# Patient Record
Sex: MALE | Race: BLACK OR AFRICAN AMERICAN | Employment: FULL TIME | URBAN - METROPOLITAN AREA
[De-identification: names, ages, dates, MRNs, and addresses within clinical notes are randomized per-mention and may not be internally consistent; named-entity substitution may affect disease eponyms.]

---

## 2017-11-25 ENCOUNTER — HOSPITAL ENCOUNTER (EMERGENCY)
Age: 32
Discharge: HOME | End: 2017-11-25
Attending: EMERGENCY MEDICINE | Admitting: EMERGENCY MEDICINE
Payer: COMMERCIAL

## 2017-11-25 VITALS
SYSTOLIC BLOOD PRESSURE: 137 MMHG | TEMPERATURE: 98 F | RESPIRATION RATE: 16 BRPM | BODY MASS INDEX: 27.77 KG/M2 | OXYGEN SATURATION: 97 % | HEART RATE: 83 BPM | WEIGHT: 205 LBS | HEIGHT: 72 IN | DIASTOLIC BLOOD PRESSURE: 91 MMHG

## 2017-11-25 DIAGNOSIS — L03.011 CELLULITIS OF FINGER OF RIGHT HAND: Primary | ICD-10-CM

## 2017-11-25 PROCEDURE — 99283 EMERGENCY DEPT VISIT LOW MDM: CPT

## 2017-11-25 PROCEDURE — 74011250637 HC RX REV CODE- 250/637: Performed by: PHYSICIAN ASSISTANT

## 2017-11-25 RX ORDER — CEPHALEXIN 250 MG/1
500 CAPSULE ORAL
Status: COMPLETED | OUTPATIENT
Start: 2017-11-25 | End: 2017-11-25

## 2017-11-25 RX ORDER — SULFAMETHOXAZOLE AND TRIMETHOPRIM 400; 80 MG/1; MG/1
1 TABLET ORAL
Status: COMPLETED | OUTPATIENT
Start: 2017-11-25 | End: 2017-11-25

## 2017-11-25 RX ORDER — CEPHALEXIN 500 MG/1
500 CAPSULE ORAL 4 TIMES DAILY
Qty: 28 CAP | Refills: 0 | Status: SHIPPED | OUTPATIENT
Start: 2017-11-25 | End: 2017-12-02

## 2017-11-25 RX ORDER — SULFAMETHOXAZOLE AND TRIMETHOPRIM 800; 160 MG/1; MG/1
1 TABLET ORAL 2 TIMES DAILY
Qty: 14 TAB | Refills: 0 | Status: SHIPPED | OUTPATIENT
Start: 2017-11-25 | End: 2017-12-02

## 2017-11-25 RX ADMIN — SULFAMETHOXAZOLE AND TRIMETHOPRIM 1 TABLET: 400; 80 TABLET ORAL at 20:32

## 2017-11-25 RX ADMIN — CEPHALEXIN 500 MG: 250 CAPSULE ORAL at 20:39

## 2017-11-26 NOTE — ED PROVIDER NOTES
HPI Comments: Geraldine Schroeder is a 28 y.o. Male who presents to the ED c/o right middle finger redness and swelling for the past 1-2 months. Patient reports it has been gradually worsening. He states initially there was an area of hardened skin on the volar aspect of the finger that he attributed to lifting at work. He initially tried to remove the skin with nail clippers. He denies fevers, chills, nausea, vomiting. Right hand dominant. Patient is a 28 y.o. male presenting with finger swelling. The history is provided by the patient. Finger Swelling    This is a chronic problem. The current episode started more than 1 week ago. The problem has been gradually worsening. History reviewed. No pertinent past medical history. History reviewed. No pertinent surgical history. History reviewed. No pertinent family history. Social History     Social History    Marital status: SINGLE     Spouse name: N/A    Number of children: N/A    Years of education: N/A     Occupational History    Not on file. Social History Main Topics    Smoking status: Current Every Day Smoker     Packs/day: 1.00    Smokeless tobacco: Never Used    Alcohol use No    Drug use: No    Sexual activity: Not on file     Other Topics Concern    Not on file     Social History Narrative    No narrative on file         ALLERGIES: Review of patient's allergies indicates no known allergies. Review of Systems   Constitutional: Negative for chills and fever. Gastrointestinal: Negative for nausea and vomiting. Skin: Positive for color change and wound. All other systems reviewed and are negative. Vitals:    11/25/17 1951   BP: (!) 137/91   Pulse: 83   Resp: 16   Temp: 98 °F (36.7 °C)   SpO2: 97%   Weight: 93 kg (205 lb)   Height: 6' (1.829 m)            Physical Exam   Constitutional: He is oriented to person, place, and time. He appears well-developed and well-nourished. No distress.    HENT:   Head: Normocephalic and atraumatic. Right Ear: External ear normal.   Left Ear: External ear normal.   Nose: Nose normal.   Cardiovascular: Normal rate, regular rhythm and normal heart sounds. Pulmonary/Chest: Effort normal and breath sounds normal. No respiratory distress. He has no wheezes. Neurological: He is alert and oriented to person, place, and time. Skin: Skin is warm and dry. He is not diaphoretic. Right middle finger is slightly edematous with erythema noted at the distal portion of the digit. At the PIP joint there is an area of hyperkeratosis c/w a callus. There is no underlying fluctuance. Patient has FROM of the digit against resistance, cap refill < 2 seconds, report of good sensation. Psychiatric: He has a normal mood and affect. His behavior is normal.   Nursing note and vitals reviewed. MDM  Number of Diagnoses or Management Options  Cellulitis of finger of right hand:   Diagnosis management comments: Differential diagnosis: cellulitis, felon, flexor tenosynovitis, abscess, scabies    Pt with what appears to be a callus with surrounding cellulitis. Suspect patient introduced pathogen when clipping callus. No fluctuance suggesting the need for I&D or felon. Patient able to fully extend the finger, making likelihood of flexor tenosynovitis low. Will place patient on abx to cover for cellulitis + MRSA. Pt given first dose in the ED as he will not be able to get scripts filled this evening. Patient was advised to return to the ER for worsening erythema, edema, pain, development of fevers/chills and nausea/vomiting as he is without insurance and it will be hard to ascertain follow up. Pt given [employer] and [address] [place] [address] resources for future primary care needs. ED Course       Procedures  None. Diagnosis:   1. Cellulitis of finger of right hand          Disposition: Discharge.     Follow-up Information     Follow up With Details Comments Contact Info    Latoya   [address] [place] [address]  [id number]  [address]  [phone]          Discharge Medication List as of 11/25/2017  8:34 PM      START taking these medications    Details   cephALEXin (KEFLEX) 500 mg capsule Take 1 Cap by mouth four (4) times daily for 7 days. , Print, Disp-28 Cap, R-0      trimethoprim-sulfamethoxazole (BACTRIM DS) 160-800 mg per tablet Take 1 Tab by mouth two (2) times a day for 7 days. , Print, Disp-14 Tab, R-0           Yanet Nguyễn PA-C 8:49 PM

## 2017-11-26 NOTE — ED TRIAGE NOTES
Alert male c/o 9/10 sharp pain to rt 4th finger, +swelling. Pt reports swelling over past few months.

## 2017-11-26 NOTE — DISCHARGE INSTRUCTIONS
Cellulitis: Care Instructions  Your Care Instructions    Cellulitis is a skin infection. It often occurs after a break in the skin from a scrape, cut, bite, or puncture, or after a rash. The doctor has checked you carefully, but problems can develop later. If you notice any problems or new symptoms, get medical treatment right away. Follow-up care is a key part of your treatment and safety. Be sure to make and go to all appointments, and call your doctor if you are having problems. It's also a good idea to know your test results and keep a list of the medicines you take. How can you care for yourself at home? · Take your antibiotics as directed. Do not stop taking them just because you feel better. You need to take the full course of antibiotics. · Prop up the infected area on pillows to reduce pain and swelling. Try to keep the area above the level of your heart as often as you can. · If your doctor told you how to care for your wound, follow your doctor's instructions. If you did not get instructions, follow this general advice:  ¨ Wash the wound with clean water 2 times a day. Don't use hydrogen peroxide or alcohol, which can slow healing. ¨ You may cover the wound with a thin layer of petroleum jelly, such as Vaseline, and a nonstick bandage. ¨ Apply more petroleum jelly and replace the bandage as needed. · Be safe with medicines. Take pain medicines exactly as directed. ¨ If the doctor gave you a prescription medicine for pain, take it as prescribed. ¨ If you are not taking a prescription pain medicine, ask your doctor if you can take an over-the-counter medicine. To prevent cellulitis in the future  · Try to prevent cuts, scrapes, or other injuries to your skin. Cellulitis most often occurs where there is a break in the skin. · If you get a scrape, cut, mild burn, or bite, wash the wound with clean water as soon as you can to help avoid infection.  Don't use hydrogen peroxide or alcohol, which can slow healing. · If you have swelling in your legs (edema), support stockings and good skin care may help prevent leg sores and cellulitis. · Take care of your feet, especially if you have diabetes or other conditions that increase the risk of infection. Wear shoes and socks. Do not go barefoot. If you have athlete's foot or other skin problems on your feet, talk to your doctor about how to treat them. When should you call for help? Call your doctor now or seek immediate medical care if:  ? · You have signs that your infection is getting worse, such as:  ¨ Increased pain, swelling, warmth, or redness. ¨ Red streaks leading from the area. ¨ Pus draining from the area. ¨ A fever. ? · You get a rash. ? Watch closely for changes in your health, and be sure to contact your doctor if:  ? · You are not getting better after 1 day (24 hours). ? · You do not get better as expected. Where can you learn more? Go to http://timmy-cynthia.info/. Renetta Gutierrez in the search box to learn more about \"Cellulitis: Care Instructions. \"  Current as of: October 13, 2016  Content Version: 11.4  © 1253-1506 [employer]. Care instructions adapted under license by [employer] (which disclaims liability or warranty for this information). If you have questions about a medical condition or this instruction, always ask your healthcare professional. [place] [address] any warranty or liability for your use of this information.

## 2018-03-03 ENCOUNTER — HOSPITAL ENCOUNTER (EMERGENCY)
Age: 33
Discharge: HOME | End: 2018-03-03
Attending: EMERGENCY MEDICINE
Payer: COMMERCIAL

## 2018-03-03 VITALS
SYSTOLIC BLOOD PRESSURE: 155 MMHG | HEIGHT: 72 IN | HEART RATE: 77 BPM | BODY MASS INDEX: 25.73 KG/M2 | TEMPERATURE: 98 F | OXYGEN SATURATION: 99 % | RESPIRATION RATE: 18 BRPM | DIASTOLIC BLOOD PRESSURE: 131 MMHG | WEIGHT: 190 LBS

## 2018-03-03 DIAGNOSIS — L72.0 EPIDERMOID CYST OF FINGER OF RIGHT HAND: Primary | ICD-10-CM

## 2018-03-03 DIAGNOSIS — R03.0 ELEVATED BLOOD PRESSURE READING: ICD-10-CM

## 2018-03-03 PROCEDURE — 99282 EMERGENCY DEPT VISIT SF MDM: CPT

## 2018-03-03 RX ORDER — CEPHALEXIN 500 MG/1
500 CAPSULE ORAL 3 TIMES DAILY
Qty: 21 CAP | Refills: 0 | Status: SHIPPED | OUTPATIENT
Start: 2018-03-03 | End: 2018-03-10

## 2018-03-03 NOTE — DISCHARGE INSTRUCTIONS
Apply warm compresses frequently throughout the day. Ice can also help with swelling. Complete entire course of antibiotics. Keep area clean with antibacterial soap and water. Return to ED in 2 days if symptoms have not improved. Return sooner if swelling, redness, or pain worsens, or if fevers occur. Narcotics cannot be taken while driving. Take NSAIDs such as tylenol or ibuprofin as directed for pain control. Do not take on an empty stomach. Epidermoid Cyst: Care Instructions  Your Care Instructions  An epidermoid (say \"fn-nho-CPG-moyd\") cyst is a lump just under the skin. These cysts can form when a hair follicle becomes blocked. They are common in acne and may occur on the face, neck, back, and genitals. However, they can form anywhere on the body. These cysts are not cancer and do not lead to cancer. They tend not to hurt, but they can sometimes become swollen and painful. They also may break open (rupture) and cause scarring. These cysts sometimes do not cause problems and may not need treatment. If you have a cyst that is swollen and hurts, your doctor may inject it with a medicine to help it heal. But it is more likely that a painful cyst will need to be removed. Your doctor will give you a shot of numbing medicine and cut into the cyst to drain it or remove it. This makes the symptoms go away. Follow-up care is a key part of your treatment and safety. Be sure to make and go to all appointments, and call your doctor if you are having problems. It's also a good idea to know your test results and keep a list of the medicines you take. How can you care for yourself at home? · Do not squeeze the cyst or poke it with a needle to open it. This can cause swelling, redness, and infection. · Always have a doctor look at any new lumps you get to make sure that they are not serious. When should you call for help?   Watch closely for changes in your health, and be sure to contact your doctor if:  ? · You have a fever, redness, or swelling after you get a shot of medicine in the cyst.   ? · You see or feel a new lump on your skin. Where can you learn more? Go to http://timmy-cynthia.info/. Enter M152 in the search box to learn more about \"Epidermoid Cyst: Care Instructions. \"  Current as of: October 13, 2016  Content Version: 11.4  © 9197-6413 [employer]. Care instructions adapted under license by [employer] (which disclaims liability or warranty for this information). If you have questions about a medical condition or this instruction, always ask your healthcare professional. [place] [address] any warranty or liability for your use of this information.

## 2018-03-03 NOTE — ED PROVIDER NOTES
EMERGENCY DEPARTMENT HISTORY AND PHYSICAL EXAM    10:12 AM      Date: 3/3/2018  Patient Name: Clayton Liu    History of Presenting Illness     Chief Complaint   Patient presents with    Finger Pain       History Provided By: Patient    Chief Complaint: Finger swelling  Duration:  Weeks (x2)  Timing:  Acute  Location: Right, fourth finger  Quality: N/A  Severity: 10 out of 10  Modifying Factors: No identifiable modifying factors   Associated Symptoms: Denies fever and redness      Additional History (Context): Clayton Liu is a 28 y.o. male with No significant past medical history who presents to the ED c/o right fourth finger swelling onset two weeks ago. Pt states he had an episode like this one year ago, involving the same finger, and it was resolved with antibiotics. Pt denies fever and noticing redness of the finger. Pt works with his hands and noted his finger pain is exacerbated by this work. PCP: None    Current Outpatient Prescriptions   Medication Sig Dispense Refill    cephALEXin (KEFLEX) 500 mg capsule Take 1 Cap by mouth three (3) times daily for 7 days. 21 Cap 0       Past History     Past Medical History:  History reviewed. No pertinent past medical history. Past Surgical History:  History reviewed. No pertinent surgical history. Family History:  History reviewed. No pertinent family history. Social History:  Social History   Substance Use Topics    Smoking status: Current Every Day Smoker     Packs/day: 1.00    Smokeless tobacco: Never Used    Alcohol use No       Allergies:  No Known Allergies      Review of Systems     Review of Systems   Constitutional: Negative for fever. HENT: Negative for facial swelling. Eyes: Negative for visual disturbance. Respiratory: Negative for shortness of breath. Cardiovascular: Negative for chest pain. Gastrointestinal: Negative for abdominal pain. Genitourinary: Negative for dysuria. Musculoskeletal: Negative for neck pain. Positive for right, fourth finger pain and swelling   Skin: Negative for color change and rash. Neurological: Negative for dizziness. Psychiatric/Behavioral: Negative for confusion. All other systems reviewed and are negative. Physical Exam     Visit Vitals    BP (!) 155/131 (BP 1 Location: Right arm, BP Patient Position: At rest)    Pulse 77    Temp 98 °F (36.7 °C)    Resp 18    Ht 6' (1.829 m)    Wt 86.2 kg (190 lb)    SpO2 99%    BMI 25.77 kg/m2       Physical Exam   Constitutional: He appears well-developed and well-nourished. No distress. HENT:   Head: Normocephalic and atraumatic. Eyes: Conjunctivae are normal.   Neck: Normal range of motion. Neck supple. Cardiovascular: Normal rate. Pulmonary/Chest: Effort normal.   Abdominal: Soft. Musculoskeletal: Normal range of motion. Neurological: He is alert. Skin: Skin is warm and dry. He is not diaphoretic. Cystic mass to right ring finger over the flexor side of the proximal phalanx. FROM. No tenderness along the flexor tendon sheath. No erythema. Psychiatric: He has a normal mood and affect. Nursing note and vitals reviewed. Diagnostic Study Results     Labs -  No results found for this or any previous visit (from the past 12 hour(s)). Radiologic Studies -   No orders to display         Medical Decision Making   I am the first provider for this patient. I reviewed the vital signs, available nursing notes, past medical history, past surgical history, family history and social history. Vital Signs-Reviewed the patient's vital signs. Pulse Oximetry Analysis -  99% on room air     Cardiac Monitor:  Rate: 77  Rhythm:  Normal Sinus Rhythm     Records Reviewed: Old Medical Records (Time of Review: 10:12 AM)    ED Course: Progress Notes, Reevaluation, and Consults:    Provider Notes (Medical Decision Making): cystic mass over the felxor surface of the finger. No erythema. Indurated, not fluctuant.   Does not require I&D today. Will start on abx. Discussed treatment plan, return precautions, symptomatic relief, and expected time to improvement. All questions answered. Patient is stable for discharge and outpatient management. No signs of tenosynovitis. Diagnosis     Clinical Impression:   1. Epidermoid cyst of finger of right hand    2. Elevated blood pressure reading        Disposition: Discharge    Follow-up Information     Follow up With Details Comments Contact [facility] EMERGENCY DEPT In 2 days If symptoms do not improve [address]    [facility] EMERGENCY DEPT  Immediately if symptoms worsen [address] [facility] Schedule an appointment as soon as possible for a visit for management of high blood pressure  [address] [facility] [address]  [phone]           Patient's Medications   Start Taking    CEPHALEXIN (KEFLEX) 500 MG CAPSULE    Take 1 Cap by mouth three (3) times daily for 7 days. Continue Taking    No medications on file   These Medications have changed    No medications on file   Stop Taking    No medications on file     _______________________________    Attestations:  [address] [place] [address] acting as a scribe for and in the presence of Giovanna Campa PA-C       March 03, 2018 at 10:12 AM       Provider Attestation:      I personally performed the services described in the documentation, reviewed the documentation, as recorded by the scribe in my presence, and it accurately and completely records my words and actions.  March 03, 2018 at 10:12 AM - Giovanna Campa PA-C   _______________________________

## 2018-03-03 NOTE — LETTER
NOTIFICATION OF RETURN TO WORK / SCHOOL 
 
3/3/2018 Mr. Noel Valencia [address] [facility] [address] [id number] To Whom it may concern, Please excuse Noel Valencia from work 03/03/18 - 3/4/18 for medical reasons.    
 
 
Sincerely,  
 
 
 
 
Terell Carrera PA-C